# Patient Record
Sex: FEMALE | Race: WHITE | NOT HISPANIC OR LATINO | Employment: FULL TIME | URBAN - METROPOLITAN AREA
[De-identification: names, ages, dates, MRNs, and addresses within clinical notes are randomized per-mention and may not be internally consistent; named-entity substitution may affect disease eponyms.]

---

## 2023-03-21 ENCOUNTER — TELEPHONE (OUTPATIENT)
Dept: OBSTETRICS AND GYNECOLOGY | Facility: CLINIC | Age: 28
End: 2023-03-21
Payer: COMMERCIAL

## 2023-03-21 NOTE — TELEPHONE ENCOUNTER
----- Message from Suad Gilliam MA sent at 3/21/2023  1:08 PM CDT -----  Contact: PT  Has she ever seen you?  ----- Message -----  From: Frances Goff  Sent: 3/21/2023  12:12 PM CDT  To: Casandra Wallace I Staff    Type:  APPT REQUESY     Who Called: FORMER PT   Symptoms (please be specific): REPLACE BC IMPLANT    Would the patient rather a call back or a response via [facility]? CALL   Best Call Back Number: [phone]    Additional Information: THANK YOU

## 2023-03-21 NOTE — TELEPHONE ENCOUNTER
She has not been seen since 2020.  She will need to have her annual first and then I can order for her Nexplanon replacement.

## 2023-03-24 ENCOUNTER — LAB VISIT (OUTPATIENT)
Dept: LAB | Facility: CLINIC | Age: 28
End: 2023-03-24
Payer: COMMERCIAL

## 2023-03-24 ENCOUNTER — OFFICE VISIT (OUTPATIENT)
Dept: OBSTETRICS AND GYNECOLOGY | Facility: CLINIC | Age: 28
End: 2023-03-24
Payer: COMMERCIAL

## 2023-03-24 VITALS
BODY MASS INDEX: 23.84 KG/M2 | HEIGHT: 66 IN | HEART RATE: 102 BPM | WEIGHT: 148.38 LBS | SYSTOLIC BLOOD PRESSURE: 133 MMHG | DIASTOLIC BLOOD PRESSURE: 98 MMHG

## 2023-03-24 DIAGNOSIS — Z30.46 NEXPLANON REMOVAL: ICD-10-CM

## 2023-03-24 DIAGNOSIS — Z01.419 ENCOUNTER FOR WELL WOMAN EXAM WITH ROUTINE GYNECOLOGICAL EXAM: ICD-10-CM

## 2023-03-24 DIAGNOSIS — Z01.419 ENCOUNTER FOR WELL WOMAN EXAM WITH ROUTINE GYNECOLOGICAL EXAM: Primary | ICD-10-CM

## 2023-03-24 DIAGNOSIS — Z30.017 NEXPLANON INSERTION: ICD-10-CM

## 2023-03-24 LAB
CHOLEST SERPL-MCNC: 137 MG/DL (ref 120–199)
CHOLEST/HDLC SERPL: 3 {RATIO} (ref 2–5)
ESTIMATED AVG GLUCOSE: 91 MG/DL (ref 68–131)
HBA1C MFR BLD: 4.8 % (ref 4–5.6)
HDLC SERPL-MCNC: 45 MG/DL (ref 40–75)
HDLC SERPL: 32.8 % (ref 20–50)
LDLC SERPL CALC-MCNC: 77.4 MG/DL (ref 63–159)
NONHDLC SERPL-MCNC: 92 MG/DL
TRIGL SERPL-MCNC: 73 MG/DL (ref 30–150)

## 2023-03-24 PROCEDURE — 99385 PR PREVENTIVE VISIT,NEW,18-39: ICD-10-PCS | Mod: S$GLB,,, | Performed by: NURSE PRACTITIONER

## 2023-03-24 PROCEDURE — 1160F RVW MEDS BY RX/DR IN RCRD: CPT | Mod: S$GLB,,, | Performed by: NURSE PRACTITIONER

## 2023-03-24 PROCEDURE — 3080F DIAST BP >= 90 MM HG: CPT | Mod: S$GLB,,, | Performed by: NURSE PRACTITIONER

## 2023-03-24 PROCEDURE — 36415 COLL VENOUS BLD VENIPUNCTURE: CPT | Mod: ,,, | Performed by: STUDENT IN AN ORGANIZED HEALTH CARE EDUCATION/TRAINING PROGRAM

## 2023-03-24 PROCEDURE — 83036 HEMOGLOBIN GLYCOSYLATED A1C: CPT | Performed by: NURSE PRACTITIONER

## 2023-03-24 PROCEDURE — 3075F PR MOST RECENT SYSTOLIC BLOOD PRESS GE 130-139MM HG: ICD-10-PCS | Mod: S$GLB,,, | Performed by: NURSE PRACTITIONER

## 2023-03-24 PROCEDURE — 36415 PR COLLECTION VENOUS BLOOD,VENIPUNCTURE: ICD-10-PCS | Mod: ,,, | Performed by: STUDENT IN AN ORGANIZED HEALTH CARE EDUCATION/TRAINING PROGRAM

## 2023-03-24 PROCEDURE — 80061 LIPID PANEL: CPT | Performed by: NURSE PRACTITIONER

## 2023-03-24 PROCEDURE — 3080F PR MOST RECENT DIASTOLIC BLOOD PRESSURE >= 90 MM HG: ICD-10-PCS | Mod: S$GLB,,, | Performed by: NURSE PRACTITIONER

## 2023-03-24 PROCEDURE — 3008F BODY MASS INDEX DOCD: CPT | Mod: S$GLB,,, | Performed by: NURSE PRACTITIONER

## 2023-03-24 PROCEDURE — 99385 PREV VISIT NEW AGE 18-39: CPT | Mod: S$GLB,,, | Performed by: NURSE PRACTITIONER

## 2023-03-24 PROCEDURE — 1159F MED LIST DOCD IN RCRD: CPT | Mod: S$GLB,,, | Performed by: NURSE PRACTITIONER

## 2023-03-24 PROCEDURE — 3075F SYST BP GE 130 - 139MM HG: CPT | Mod: S$GLB,,, | Performed by: NURSE PRACTITIONER

## 2023-03-24 PROCEDURE — 1160F PR REVIEW ALL MEDS BY PRESCRIBER/CLIN PHARMACIST DOCUMENTED: ICD-10-PCS | Mod: S$GLB,,, | Performed by: NURSE PRACTITIONER

## 2023-03-24 PROCEDURE — 88175 CYTOPATH C/V AUTO FLUID REDO: CPT | Performed by: NURSE PRACTITIONER

## 2023-03-24 PROCEDURE — 1159F PR MEDICATION LIST DOCUMENTED IN MEDICAL RECORD: ICD-10-PCS | Mod: S$GLB,,, | Performed by: NURSE PRACTITIONER

## 2023-03-24 PROCEDURE — 3008F PR BODY MASS INDEX (BMI) DOCUMENTED: ICD-10-PCS | Mod: S$GLB,,, | Performed by: NURSE PRACTITIONER

## 2023-03-24 RX ORDER — FLUCONAZOLE 150 MG/1
TABLET ORAL
Qty: 2 TABLET | Refills: 3 | Status: SHIPPED | OUTPATIENT
Start: 2023-03-24 | End: 2023-04-07 | Stop reason: SDUPTHER

## 2023-03-24 NOTE — PROGRESS NOTES
"CC: Well woman exam    Jennifer Yang is a 27 y.o. female No obstetric history on file. presents for well woman exam.  LMP: Patient's last menstrual period was 02/15/2023 (exact date)..   Patients last pap was 2019.  Last wellness labs were done 2020.   Birth control nexplanon.  SBE yes. PCP-none.  She is due for Nexplanon replacement.    Chief Complaint   Patient presents with    Well Woman        History reviewed. No pertinent past medical history.  History reviewed. No pertinent surgical history.  Social History     Socioeconomic History    Marital status: Single   Tobacco Use    Smoking status: Never    Smokeless tobacco: Never     Family History   Problem Relation Age of Onset    Irritable bowel syndrome Mother     Endometriosis Mother      OB History    No obstetric history on file.         BP (!) 133/98 (BP Location: Right arm, Patient Position: Sitting)   Pulse 102   Ht 5' 6" (1.676 m)   Wt 67.3 kg (148 lb 6.4 oz)   LMP 02/15/2023 (Exact Date) Comment: nexplanon  BMI 23.95 kg/m²       ROS:  GENERAL: Denies weight gain or weight loss. Feeling well overall.   SKIN: Denies rash or lesions.   HEAD: Denies head injury or headache.   NODES: Denies enlarged lymph nodes.   CHEST: Denies chest pain or shortness of breath.   CARDIOVASCULAR: Denies palpitations or left sided chest pain.   ABDOMEN: No abdominal pain, constipation, diarrhea, nausea, vomiting or rectal bleeding.   URINARY: No frequency, dysuria, hematuria, or burning on urination.  REPRODUCTIVE: See HPI.   BREASTS: The patient performs breast self-examination and denies pain, lumps, or nipple discharge.   HEMATOLOGIC: No easy bruisability or excessive bleeding.   MUSCULOSKELETAL: Denies joint pain or swelling.   NEUROLOGIC: Denies syncope or weakness.   PSYCHIATRIC: Denies depression, anxiety or mood swings.    PHYSICAL EXAM:  APPEARANCE: Well nourished, well developed, in no acute distress.  AFFECT: WNL, alert and oriented x 3  SKIN: No acne or " hirsutism  NECK: Neck symmetric without masses or thyromegaly  NODES: No inguinal, cervical, or axillary lymph node enlargement  CHEST: Good respiratory effect  ABDOMEN: Soft.  No tenderness or masses.  No hepatosplenomegaly.  No hernias.  BREASTS: Symmetrical, no skin changes or visible lesions.  No palpable masses, nipple discharge bilaterally.  PELVIC: Normal external genitalia without lesions.  Normal urethral meatus.  Vagina well rugated without lesions or discharge.  Cervix pink, without lesions, discharge or tenderness.  No significant cystocele or rectocele.  Bimanual exam shows uterus to be normal size, regular, mobile and nontender.  Adnexa without masses or tenderness.    EXTREMITIES: No edema.    Encounter for well woman exam with routine gynecological exam  -     Lipid Panel; Future; Expected date: 03/24/2023  -     Hemoglobin A1C; Future; Expected date: 03/24/2023  -     Liquid-Based Pap Smear, Screening    Nexplanon insertion  -     Device Authorization Order    Nexplanon removal  -     Device Authorization Order    Other orders  -     fluconazole (DIFLUCAN) 150 MG Tab; Take one by mouth now and may repeat in 72 hours.  Dispense: 2 tablet; Refill: 3      She will f/u for Nexplanon replacement.      Patient was counseled today on A.C.S. Pap guidelines and recommendations for yearly pelvic exams, mammograms and monthly self breast exams; to see her PCP for other health maintenance.     Follow up in about 1 year (around 3/24/2024).

## 2023-03-30 LAB
FINAL PATHOLOGIC DIAGNOSIS: NORMAL
Lab: NORMAL

## 2023-04-12 ENCOUNTER — PROCEDURE VISIT (OUTPATIENT)
Dept: OBSTETRICS AND GYNECOLOGY | Facility: CLINIC | Age: 28
End: 2023-04-12
Payer: COMMERCIAL

## 2023-04-12 VITALS
BODY MASS INDEX: 23.14 KG/M2 | WEIGHT: 144 LBS | HEIGHT: 66 IN | DIASTOLIC BLOOD PRESSURE: 58 MMHG | SYSTOLIC BLOOD PRESSURE: 100 MMHG

## 2023-04-12 DIAGNOSIS — Z30.017 NEXPLANON INSERTION: ICD-10-CM

## 2023-04-12 DIAGNOSIS — Z30.46 ENCOUNTER FOR SURVEILLANCE OF IMPLANTABLE SUBDERMAL CONTRACEPTIVE: Primary | ICD-10-CM

## 2023-04-12 DIAGNOSIS — Z30.46 NEXPLANON REMOVAL: ICD-10-CM

## 2023-04-12 LAB
B-HCG UR QL: NEGATIVE
CTP QC/QA: YES

## 2023-04-12 PROCEDURE — 11983 REMOVE/INSERT DRUG IMPLANT: CPT | Mod: S$GLB,,, | Performed by: NURSE PRACTITIONER

## 2023-04-12 PROCEDURE — 81025 POCT URINE PREGNANCY: ICD-10-PCS | Mod: S$GLB,,, | Performed by: NURSE PRACTITIONER

## 2023-04-12 PROCEDURE — 11983 REMOVAL OF NEXPLANON DEVICE: ICD-10-PCS | Mod: S$GLB,,, | Performed by: NURSE PRACTITIONER

## 2023-04-12 PROCEDURE — 81025 URINE PREGNANCY TEST: CPT | Mod: S$GLB,,, | Performed by: NURSE PRACTITIONER

## 2023-04-12 NOTE — PROCEDURES
Removal of Nexplanon Device    Date/Time: 4/12/2023 9:30 AM  Performed by: Trudi Duffy NP  Authorized by: Trudi Duffy NP     Consent obtained:  Written  Consent given by:  Patient  Procedure risks and benefits discussed: yes    Patient questions answered: yes    Patient agrees, verbalizes understanding, and wants to proceed: yes    Instructions and paperwork completed: yes    Implant grasped by: hemostat  Removed with no complications: yes    Removal due to expiration: yes    Arm: left  Palpation confirms location: yes  Small stab incision was made in arm: yes  Upon removal device was intact: yes  Site was close with steri-strips and pressure bandage applied: yes  Prepped with:  povidone-iodine 7.5% surgical scrub  Local anesthetic:  Lidocaine with epinephrine   The site was cleaned  and prepped in a sterile fashion: yes  Insertion of Nexplanon    Date/Time: 4/12/2023 9:30 AM  Performed by: Trudi Duffy NP  Authorized by: Trudi Duffy NP     Consent obtained:  Verbal and written  Consent given by:  Patient  Patient questions answered: yes    Patient agrees, verbalizes understanding, and wants to proceed: yes    Instructions and paperwork completed: yes    Pre-procedure timeout performed: yes    Prepped with: alcohol 70% and povidone-iodine    Local anesthetic:  Xylocaine with epinephrine  The site was cleaned and prepped in a sterile fashion: yes    Small stab incision was made in arm: no     68 mg etonogestreL 68 mg  Preloaded Implanon trocar was placed subdermally: yes    Visualization of implant was obtained: yes    Nexplanon was inserted and trocar removed: yes    Visualization of notch in stilette and palpitation of device: yes    Palpitation confirms placement by provider and patient: yes    Site was closed with steri-strips and pressure bandage applied: yes

## 2023-08-23 ENCOUNTER — OFFICE VISIT (OUTPATIENT)
Dept: FAMILY MEDICINE | Facility: CLINIC | Age: 28
End: 2023-08-23
Payer: COMMERCIAL

## 2023-08-23 VITALS
DIASTOLIC BLOOD PRESSURE: 68 MMHG | WEIGHT: 132.63 LBS | HEART RATE: 90 BPM | BODY MASS INDEX: 21.32 KG/M2 | RESPIRATION RATE: 14 BRPM | HEIGHT: 66 IN | SYSTOLIC BLOOD PRESSURE: 102 MMHG | OXYGEN SATURATION: 99 %

## 2023-08-23 DIAGNOSIS — Z13.31 POSITIVE DEPRESSION SCREENING: Primary | ICD-10-CM

## 2023-08-23 DIAGNOSIS — F33.2 SEVERE EPISODE OF RECURRENT MAJOR DEPRESSIVE DISORDER, WITHOUT PSYCHOTIC FEATURES: ICD-10-CM

## 2023-08-23 PROCEDURE — 3008F PR BODY MASS INDEX (BMI) DOCUMENTED: ICD-10-PCS | Mod: S$GLB,,, | Performed by: INTERNAL MEDICINE

## 2023-08-23 PROCEDURE — 99203 OFFICE O/P NEW LOW 30 MIN: CPT | Mod: S$GLB,,, | Performed by: INTERNAL MEDICINE

## 2023-08-23 PROCEDURE — 1159F MED LIST DOCD IN RCRD: CPT | Mod: S$GLB,,, | Performed by: INTERNAL MEDICINE

## 2023-08-23 PROCEDURE — 1159F PR MEDICATION LIST DOCUMENTED IN MEDICAL RECORD: ICD-10-PCS | Mod: S$GLB,,, | Performed by: INTERNAL MEDICINE

## 2023-08-23 PROCEDURE — 3074F SYST BP LT 130 MM HG: CPT | Mod: S$GLB,,, | Performed by: INTERNAL MEDICINE

## 2023-08-23 PROCEDURE — 1160F PR REVIEW ALL MEDS BY PRESCRIBER/CLIN PHARMACIST DOCUMENTED: ICD-10-PCS | Mod: S$GLB,,, | Performed by: INTERNAL MEDICINE

## 2023-08-23 PROCEDURE — 3078F DIAST BP <80 MM HG: CPT | Mod: S$GLB,,, | Performed by: INTERNAL MEDICINE

## 2023-08-23 PROCEDURE — 3008F BODY MASS INDEX DOCD: CPT | Mod: S$GLB,,, | Performed by: INTERNAL MEDICINE

## 2023-08-23 PROCEDURE — 3074F PR MOST RECENT SYSTOLIC BLOOD PRESSURE < 130 MM HG: ICD-10-PCS | Mod: S$GLB,,, | Performed by: INTERNAL MEDICINE

## 2023-08-23 PROCEDURE — 99999 PR PBB SHADOW E&M-EST. PATIENT-LVL III: CPT | Mod: PBBFAC,,, | Performed by: INTERNAL MEDICINE

## 2023-08-23 PROCEDURE — 3044F HG A1C LEVEL LT 7.0%: CPT | Mod: S$GLB,,, | Performed by: INTERNAL MEDICINE

## 2023-08-23 PROCEDURE — 1160F RVW MEDS BY RX/DR IN RCRD: CPT | Mod: S$GLB,,, | Performed by: INTERNAL MEDICINE

## 2023-08-23 PROCEDURE — 99203 PR OFFICE/OUTPT VISIT, NEW, LEVL III, 30-44 MIN: ICD-10-PCS | Mod: S$GLB,,, | Performed by: INTERNAL MEDICINE

## 2023-08-23 PROCEDURE — 99999 PR PBB SHADOW E&M-EST. PATIENT-LVL III: ICD-10-PCS | Mod: PBBFAC,,, | Performed by: INTERNAL MEDICINE

## 2023-08-23 PROCEDURE — 3078F PR MOST RECENT DIASTOLIC BLOOD PRESSURE < 80 MM HG: ICD-10-PCS | Mod: S$GLB,,, | Performed by: INTERNAL MEDICINE

## 2023-08-23 PROCEDURE — 3044F PR MOST RECENT HEMOGLOBIN A1C LEVEL <7.0%: ICD-10-PCS | Mod: S$GLB,,, | Performed by: INTERNAL MEDICINE

## 2023-08-23 RX ORDER — SERTRALINE HYDROCHLORIDE 50 MG/1
50 TABLET, FILM COATED ORAL DAILY
Qty: 30 TABLET | Refills: 11 | Status: SHIPPED | OUTPATIENT
Start: 2023-08-23 | End: 2023-09-20 | Stop reason: SDUPTHER
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
## 2023-08-23 NOTE — ASSESSMENT & PLAN NOTE
T/c referral to Psych  Restart zoloft  Patient was advised re posssible AEs including sometimes inc depr, suicide initially

## 2023-08-25 ENCOUNTER — PATIENT MESSAGE (OUTPATIENT)
Dept: PRIMARY CARE CLINIC | Facility: CLINIC | Age: 28
End: 2023-08-25
Payer: COMMERCIAL

## 2023-09-20 ENCOUNTER — OFFICE VISIT (OUTPATIENT)
Dept: FAMILY MEDICINE | Facility: CLINIC | Age: 28
End: 2023-09-20
Payer: COMMERCIAL

## 2023-09-20 VITALS
HEIGHT: 66 IN | OXYGEN SATURATION: 98 % | SYSTOLIC BLOOD PRESSURE: 100 MMHG | DIASTOLIC BLOOD PRESSURE: 65 MMHG | BODY MASS INDEX: 22.31 KG/M2 | WEIGHT: 138.81 LBS | HEART RATE: 62 BPM

## 2023-09-20 DIAGNOSIS — F33.2 SEVERE EPISODE OF RECURRENT MAJOR DEPRESSIVE DISORDER, WITHOUT PSYCHOTIC FEATURES: ICD-10-CM

## 2023-09-20 PROCEDURE — 3008F BODY MASS INDEX DOCD: CPT | Mod: S$GLB,,, | Performed by: INTERNAL MEDICINE

## 2023-09-20 PROCEDURE — 1160F RVW MEDS BY RX/DR IN RCRD: CPT | Mod: S$GLB,,, | Performed by: INTERNAL MEDICINE

## 2023-09-20 PROCEDURE — 3074F SYST BP LT 130 MM HG: CPT | Mod: S$GLB,,, | Performed by: INTERNAL MEDICINE

## 2023-09-20 PROCEDURE — 3078F PR MOST RECENT DIASTOLIC BLOOD PRESSURE < 80 MM HG: ICD-10-PCS | Mod: S$GLB,,, | Performed by: INTERNAL MEDICINE

## 2023-09-20 PROCEDURE — 99213 OFFICE O/P EST LOW 20 MIN: CPT | Mod: S$GLB,,, | Performed by: INTERNAL MEDICINE

## 2023-09-20 PROCEDURE — 3044F HG A1C LEVEL LT 7.0%: CPT | Mod: S$GLB,,, | Performed by: INTERNAL MEDICINE

## 2023-09-20 PROCEDURE — 99213 PR OFFICE/OUTPT VISIT, EST, LEVL III, 20-29 MIN: ICD-10-PCS | Mod: S$GLB,,, | Performed by: INTERNAL MEDICINE

## 2023-09-20 PROCEDURE — 3078F DIAST BP <80 MM HG: CPT | Mod: S$GLB,,, | Performed by: INTERNAL MEDICINE

## 2023-09-20 PROCEDURE — 3044F PR MOST RECENT HEMOGLOBIN A1C LEVEL <7.0%: ICD-10-PCS | Mod: S$GLB,,, | Performed by: INTERNAL MEDICINE

## 2023-09-20 PROCEDURE — 1160F PR REVIEW ALL MEDS BY PRESCRIBER/CLIN PHARMACIST DOCUMENTED: ICD-10-PCS | Mod: S$GLB,,, | Performed by: INTERNAL MEDICINE

## 2023-09-20 PROCEDURE — 1159F PR MEDICATION LIST DOCUMENTED IN MEDICAL RECORD: ICD-10-PCS | Mod: S$GLB,,, | Performed by: INTERNAL MEDICINE

## 2023-09-20 PROCEDURE — 1159F MED LIST DOCD IN RCRD: CPT | Mod: S$GLB,,, | Performed by: INTERNAL MEDICINE

## 2023-09-20 PROCEDURE — 3074F PR MOST RECENT SYSTOLIC BLOOD PRESSURE < 130 MM HG: ICD-10-PCS | Mod: S$GLB,,, | Performed by: INTERNAL MEDICINE

## 2023-09-20 PROCEDURE — 3008F PR BODY MASS INDEX (BMI) DOCUMENTED: ICD-10-PCS | Mod: S$GLB,,, | Performed by: INTERNAL MEDICINE

## 2023-09-20 RX ORDER — SERTRALINE HYDROCHLORIDE 50 MG/1
50 TABLET, FILM COATED ORAL NIGHTLY
Qty: 90 TABLET | Refills: 3 | Status: SHIPPED | OUTPATIENT
Start: 2023-09-20 | End: 2023-12-12 | Stop reason: SDUPTHER

## 2023-09-20 NOTE — PROGRESS NOTES
Subjective:       Patient ID: Jennifer Yang is a 27 y.o. female.    Chief Complaint: Follow-up (Medication refills )      Patient is established already .......... presents today for a f/u visit , to discuss zoloft Rx , depression        Follow-up  Pertinent negatives include no fever.   Depression  Visit Type: follow-up  Patient presents with the following symptoms: depressed mood.  Frequency of symptoms: occasionally   Severity: mild   Sleep quality: good  Compliance with medications:  %      Review of Systems   Constitutional:  Negative for activity change, fever and unexpected weight change.   Respiratory: Negative.     Cardiovascular: Negative.    Neurological: Negative.    Psychiatric/Behavioral:  Positive for depression and dysphoric mood.         Improved symptoms         Objective:      Physical Exam  Vitals and nursing note reviewed.   Constitutional:       Appearance: Normal appearance.   Cardiovascular:      Rate and Rhythm: Normal rate and regular rhythm.   Pulmonary:      Effort: Pulmonary effort is normal.      Breath sounds: Normal breath sounds.   Musculoskeletal:      Cervical back: Normal range of motion and neck supple.   Neurological:      Mental Status: She is alert.   Psychiatric:         Mood and Affect: Mood normal.         Behavior: Behavior normal.         Thought Content: Thought content normal.         Judgment: Judgment normal.         Assessment:       1. Severe episode of recurrent major depressive disorder, without psychotic features  Overview:  She has no SI, HI  Was diagnosed in the past with depression/anxiety  Stopped zoloft about 4 years ago b/o her fiance    Assessment & Plan:  Currently back on zoloft  Improved  Refill it at same 50mg a day  F/u in 3M      Other orders  -     sertraline (ZOLOFT) 50 MG tablet; Take 1 tablet (50 mg total) by mouth every evening.  Dispense: 90 tablet; Refill: 3           Plan:       1. Severe episode of recurrent major depressive disorder,  without psychotic features  Overview:  She has no SI, HI  Was diagnosed in the past with depression/anxiety  Stopped zoloft about 4 years ago b/o her fiance    Assessment & Plan:  Currently back on zoloft  Improved  Refill it at same 50mg a day  F/u in 3M      Other orders  -     sertraline (ZOLOFT) 50 MG tablet; Take 1 tablet (50 mg total) by mouth every evening.  Dispense: 90 tablet; Refill: 3

## 2023-09-20 NOTE — Clinical Note
I gave the patient written recommendations re the outstanding vaccinations. Defer HPV vaccine to Dr Guajardo

## 2023-12-12 RX ORDER — SERTRALINE HYDROCHLORIDE 50 MG/1
50 TABLET, FILM COATED ORAL NIGHTLY
Qty: 30 TABLET | Refills: 1 | Status: SHIPPED | OUTPATIENT
Start: 2023-12-12 | End: 2023-12-28

## 2023-12-13 ENCOUNTER — PATIENT MESSAGE (OUTPATIENT)
Dept: FAMILY MEDICINE | Facility: CLINIC | Age: 28
End: 2023-12-13
Payer: COMMERCIAL

## 2023-12-28 ENCOUNTER — OFFICE VISIT (OUTPATIENT)
Dept: FAMILY MEDICINE | Facility: CLINIC | Age: 28
End: 2023-12-28
Payer: COMMERCIAL

## 2023-12-28 VITALS
HEIGHT: 66 IN | SYSTOLIC BLOOD PRESSURE: 115 MMHG | HEART RATE: 76 BPM | OXYGEN SATURATION: 98 % | BODY MASS INDEX: 22.82 KG/M2 | DIASTOLIC BLOOD PRESSURE: 82 MMHG | WEIGHT: 142 LBS

## 2023-12-28 DIAGNOSIS — F33.2 SEVERE EPISODE OF RECURRENT MAJOR DEPRESSIVE DISORDER, WITHOUT PSYCHOTIC FEATURES: ICD-10-CM

## 2023-12-28 DIAGNOSIS — E78.2 MIXED HYPERLIPIDEMIA: ICD-10-CM

## 2023-12-28 DIAGNOSIS — Z13.1 DIABETES MELLITUS SCREENING: ICD-10-CM

## 2023-12-28 DIAGNOSIS — R73.9 ELEVATED BLOOD SUGAR: Primary | ICD-10-CM

## 2023-12-28 PROCEDURE — 3079F PR MOST RECENT DIASTOLIC BLOOD PRESSURE 80-89 MM HG: ICD-10-PCS | Mod: S$GLB,,, | Performed by: INTERNAL MEDICINE

## 2023-12-28 PROCEDURE — 3044F HG A1C LEVEL LT 7.0%: CPT | Mod: S$GLB,,, | Performed by: INTERNAL MEDICINE

## 2023-12-28 PROCEDURE — 3008F PR BODY MASS INDEX (BMI) DOCUMENTED: ICD-10-PCS | Mod: S$GLB,,, | Performed by: INTERNAL MEDICINE

## 2023-12-28 PROCEDURE — 3079F DIAST BP 80-89 MM HG: CPT | Mod: S$GLB,,, | Performed by: INTERNAL MEDICINE

## 2023-12-28 PROCEDURE — 1160F RVW MEDS BY RX/DR IN RCRD: CPT | Mod: S$GLB,,, | Performed by: INTERNAL MEDICINE

## 2023-12-28 PROCEDURE — 99213 PR OFFICE/OUTPT VISIT, EST, LEVL III, 20-29 MIN: ICD-10-PCS | Mod: S$GLB,,, | Performed by: INTERNAL MEDICINE

## 2023-12-28 PROCEDURE — 3074F SYST BP LT 130 MM HG: CPT | Mod: S$GLB,,, | Performed by: INTERNAL MEDICINE

## 2023-12-28 PROCEDURE — 3044F PR MOST RECENT HEMOGLOBIN A1C LEVEL <7.0%: ICD-10-PCS | Mod: S$GLB,,, | Performed by: INTERNAL MEDICINE

## 2023-12-28 PROCEDURE — 3074F PR MOST RECENT SYSTOLIC BLOOD PRESSURE < 130 MM HG: ICD-10-PCS | Mod: S$GLB,,, | Performed by: INTERNAL MEDICINE

## 2023-12-28 PROCEDURE — 1159F MED LIST DOCD IN RCRD: CPT | Mod: S$GLB,,, | Performed by: INTERNAL MEDICINE

## 2023-12-28 PROCEDURE — 99213 OFFICE O/P EST LOW 20 MIN: CPT | Mod: S$GLB,,, | Performed by: INTERNAL MEDICINE

## 2023-12-28 PROCEDURE — 1159F PR MEDICATION LIST DOCUMENTED IN MEDICAL RECORD: ICD-10-PCS | Mod: S$GLB,,, | Performed by: INTERNAL MEDICINE

## 2023-12-28 PROCEDURE — 3008F BODY MASS INDEX DOCD: CPT | Mod: S$GLB,,, | Performed by: INTERNAL MEDICINE

## 2023-12-28 PROCEDURE — 1160F PR REVIEW ALL MEDS BY PRESCRIBER/CLIN PHARMACIST DOCUMENTED: ICD-10-PCS | Mod: S$GLB,,, | Performed by: INTERNAL MEDICINE

## 2023-12-28 RX ORDER — ESCITALOPRAM OXALATE 10 MG/1
10 TABLET ORAL DAILY
Qty: 30 TABLET | Refills: 2 | Status: SHIPPED | OUTPATIENT
Start: 2023-12-28 | End: 2024-03-27

## 2023-12-28 NOTE — PROGRESS NOTES
Subjective:       Patient ID: Jennifer Yang is a 28 y.o. female.    Chief Complaint: Medication Problem (Change Lexapro. Causes night sweats. )      Patient is established already .......... presents today for a f/u visit , to discuss meds for depression    Depression  Visit Type: follow-up  Frequency of symptoms: occasionally   Severity: mild   Compliance with medications:  %  Treatment side effects: inc sweating, upset stomach.    Review of Systems   Constitutional:  Negative for activity change, fever and unexpected weight change.   Respiratory: Negative.     Cardiovascular: Negative.    Gastrointestinal:         Heart burns   Endocrine:        Diaphoresis   Neurological: Negative.    Psychiatric/Behavioral:  Positive for depression.          Objective:      Physical Exam  Vitals and nursing note reviewed.   Constitutional:       Appearance: Normal appearance.   Cardiovascular:      Rate and Rhythm: Normal rate and regular rhythm.   Pulmonary:      Effort: Pulmonary effort is normal.      Breath sounds: Normal breath sounds.   Musculoskeletal:      Cervical back: Normal range of motion and neck supple.   Neurological:      Mental Status: She is alert.   Psychiatric:         Mood and Affect: Mood normal.         Assessment:       1. Elevated blood sugar  -     Hemoglobin A1C; Standing    2. Mixed hyperlipidemia  -     Lipid Panel; Future; Expected date: 12/28/2023    3. Diabetes mellitus screening  -     Glucose, Fasting; Future; Expected date: 12/28/2023    4. Severe episode of recurrent major depressive disorder, without psychotic features  Overview:  She has no SI, HI  Was diagnosed in the past with depression/anxiety  Stopped zoloft about 4 years ago b/o her fiance    Assessment & Plan:  AEs from  zoloft  C/o ' stomach upset ' and diaphoresis  Switch to lexapro      Other orders  -     EScitalopram oxalate (LEXAPRO) 10 MG tablet; Take 1 tablet (10 mg total) by mouth once daily.  Dispense: 30 tablet;  Refill: 2           Plan:       1. Elevated blood sugar  -     Hemoglobin A1C; Standing    2. Mixed hyperlipidemia  -     Lipid Panel; Future; Expected date: 12/28/2023    3. Diabetes mellitus screening  -     Glucose, Fasting; Future; Expected date: 12/28/2023    4. Severe episode of recurrent major depressive disorder, without psychotic features  Overview:  She has no SI, HI  Was diagnosed in the past with depression/anxiety  Stopped zoloft about 4 years ago b/o her fiance    Assessment & Plan:  AEs from  zoloft  C/o ' stomach upset ' and diaphoresis  Switch to lexapro      Other orders  -     EScitalopram oxalate (LEXAPRO) 10 MG tablet; Take 1 tablet (10 mg total) by mouth once daily.  Dispense: 30 tablet; Refill: 2

## 2024-02-16 ENCOUNTER — OFFICE VISIT (OUTPATIENT)
Dept: OTOLARYNGOLOGY | Facility: CLINIC | Age: 29
End: 2024-02-16
Payer: COMMERCIAL

## 2024-02-16 VITALS — BODY MASS INDEX: 22.56 KG/M2 | HEIGHT: 66 IN | WEIGHT: 140.38 LBS

## 2024-02-16 DIAGNOSIS — Z87.09 HISTORY OF PARANASAL SINUS PAIN: ICD-10-CM

## 2024-02-16 DIAGNOSIS — J34.89 NASAL VESTIBULITIS: Primary | ICD-10-CM

## 2024-02-16 PROCEDURE — 99999 PR PBB SHADOW E&M-EST. PATIENT-LVL III: CPT | Mod: PBBFAC,,, | Performed by: OTOLARYNGOLOGY

## 2024-02-16 PROCEDURE — 99203 OFFICE O/P NEW LOW 30 MIN: CPT | Mod: S$GLB,,, | Performed by: OTOLARYNGOLOGY

## 2024-02-16 PROCEDURE — 1160F RVW MEDS BY RX/DR IN RCRD: CPT | Mod: S$GLB,,, | Performed by: OTOLARYNGOLOGY

## 2024-02-16 PROCEDURE — 1159F MED LIST DOCD IN RCRD: CPT | Mod: S$GLB,,, | Performed by: OTOLARYNGOLOGY

## 2024-02-16 PROCEDURE — 3008F BODY MASS INDEX DOCD: CPT | Mod: S$GLB,,, | Performed by: OTOLARYNGOLOGY

## 2024-02-16 RX ORDER — AZELASTINE 1 MG/ML
SPRAY, METERED NASAL
Qty: 30 ML | Refills: 11 | Status: SHIPPED | OUTPATIENT
Start: 2024-02-16

## 2024-02-16 RX ORDER — SULFAMETHOXAZOLE AND TRIMETHOPRIM 800; 160 MG/1; MG/1
1 TABLET ORAL 2 TIMES DAILY
Qty: 42 TABLET | Refills: 0 | Status: SHIPPED | OUTPATIENT
Start: 2024-02-16 | End: 2024-03-08

## 2024-02-16 RX ORDER — MUPIROCIN 20 MG/G
OINTMENT TOPICAL
Qty: 1 EACH | Refills: 5 | Status: SHIPPED | OUTPATIENT
Start: 2024-02-16

## 2024-02-16 NOTE — PROGRESS NOTES
Subjective:       Patient ID: Jennifer Yang is a 28 y.o. female.    Chief Complaint: Sinus Problem (Pt c/o stuffy nose, congestion, sore throat, and tonsil stones for a week. )      This 28-year-old tells me she has just had a lifelong history of sinus and nasal problems and one of her primary issues is right-sided paranasal sinus pain when she has any kind of flare-up she not only has nasal airway obstruction but she has paranasal sinus pain around her right eye midface points to the medial canthus on the right almost never on the left    She is also mentioned tonsil stones and perhaps wonders if they are related to sinus and nasal symptoms.  Has been awhile since she has had an antibiotics but in the past they have not helped her as much as she hoped or has a lasting effect as she had hoped.  She is tried Flonase and did not get much benefit she does not think she is ever tried azelastine.          Objective:      ENT Physical Exam    Her physical exam is remarkable for quite a lot of room in her nose she has a relatively midline septum although there has a little bit of a external dorsal deflection but not as much on the inside that has a good bit of crustiness of the right anterior septum but there has a lot of room that makes me more suspicious that any pain and pressure on the right side could be a sinus obstruction and/or infection but we also discussed as mentioned below the possibility of a headache syndrome since pain and pressure is a significant component of her symptoms.      Her oropharynx looks normal I do not see any tonsil stones today and her tympanic membranes and ear canals look okay today        Assessment:       1. Nasal vestibulitis    2. History of paranasal sinus pain         Plan:          So I am going to treat her for about three weeks with Bactrim and include azelastine and mupirocin since she has crusting in her anterior nasal cavity.    We probably should do some sinus radiology when  she is throat at least x-rays here in the office

## 2024-02-23 ENCOUNTER — PATIENT MESSAGE (OUTPATIENT)
Dept: FAMILY MEDICINE | Facility: CLINIC | Age: 29
End: 2024-02-23
Payer: COMMERCIAL

## 2024-02-26 ENCOUNTER — LAB VISIT (OUTPATIENT)
Dept: LAB | Facility: CLINIC | Age: 29
End: 2024-02-26
Payer: COMMERCIAL

## 2024-02-26 DIAGNOSIS — R73.9 ELEVATED BLOOD SUGAR: ICD-10-CM

## 2024-02-26 DIAGNOSIS — Z13.1 DIABETES MELLITUS SCREENING: ICD-10-CM

## 2024-02-26 DIAGNOSIS — E78.2 MIXED HYPERLIPIDEMIA: ICD-10-CM

## 2024-02-26 LAB
CHOLEST SERPL-MCNC: 127 MG/DL (ref 120–199)
CHOLEST/HDLC SERPL: 2.8 {RATIO} (ref 2–5)
ESTIMATED AVG GLUCOSE: 91 MG/DL (ref 68–131)
GLUCOSE SERPL-MCNC: 88 MG/DL (ref 70–110)
HBA1C MFR BLD: 4.8 % (ref 4–5.6)
HDLC SERPL-MCNC: 46 MG/DL (ref 40–75)
HDLC SERPL: 36.2 % (ref 20–50)
LDLC SERPL CALC-MCNC: 71.2 MG/DL (ref 63–159)
NONHDLC SERPL-MCNC: 81 MG/DL
TRIGL SERPL-MCNC: 49 MG/DL (ref 30–150)

## 2024-02-26 PROCEDURE — 36415 COLL VENOUS BLD VENIPUNCTURE: CPT | Mod: ,,, | Performed by: INTERNAL MEDICINE

## 2024-02-26 PROCEDURE — 83036 HEMOGLOBIN GLYCOSYLATED A1C: CPT | Performed by: INTERNAL MEDICINE

## 2024-02-26 PROCEDURE — 82947 ASSAY GLUCOSE BLOOD QUANT: CPT | Performed by: INTERNAL MEDICINE

## 2024-02-26 PROCEDURE — 80061 LIPID PANEL: CPT | Performed by: INTERNAL MEDICINE

## 2024-03-01 ENCOUNTER — PATIENT MESSAGE (OUTPATIENT)
Dept: FAMILY MEDICINE | Facility: CLINIC | Age: 29
End: 2024-03-01
Payer: COMMERCIAL

## 2024-03-20 ENCOUNTER — OFFICE VISIT (OUTPATIENT)
Dept: OTOLARYNGOLOGY | Facility: CLINIC | Age: 29
End: 2024-03-20
Payer: COMMERCIAL

## 2024-03-20 VITALS — WEIGHT: 136.63 LBS | BODY MASS INDEX: 21.96 KG/M2 | HEIGHT: 66 IN

## 2024-03-20 DIAGNOSIS — J32.9 CHRONIC SINUSITIS, UNSPECIFIED LOCATION: Primary | ICD-10-CM

## 2024-03-20 DIAGNOSIS — Z87.09 HISTORY OF PARANASAL SINUS PAIN: ICD-10-CM

## 2024-03-20 DIAGNOSIS — J34.89 NASAL VESTIBULITIS: ICD-10-CM

## 2024-03-20 PROCEDURE — 3044F HG A1C LEVEL LT 7.0%: CPT | Mod: S$GLB,,, | Performed by: OTOLARYNGOLOGY

## 2024-03-20 PROCEDURE — 99999 PR PBB SHADOW E&M-EST. PATIENT-LVL III: CPT | Mod: PBBFAC,,, | Performed by: OTOLARYNGOLOGY

## 2024-03-20 PROCEDURE — 99213 OFFICE O/P EST LOW 20 MIN: CPT | Mod: S$GLB,,, | Performed by: OTOLARYNGOLOGY

## 2024-03-20 PROCEDURE — 1159F MED LIST DOCD IN RCRD: CPT | Mod: S$GLB,,, | Performed by: OTOLARYNGOLOGY

## 2024-03-20 PROCEDURE — 3008F BODY MASS INDEX DOCD: CPT | Mod: S$GLB,,, | Performed by: OTOLARYNGOLOGY

## 2024-03-20 PROCEDURE — 1160F RVW MEDS BY RX/DR IN RCRD: CPT | Mod: S$GLB,,, | Performed by: OTOLARYNGOLOGY

## 2024-03-20 NOTE — PROGRESS NOTES
Subjective:       Patient ID: Jennifer Yang is a 28 y.o. female.    Chief Complaint: Follow-up (Pt is here to follow up on sinus infection)      When I saw this generally healthy 28-year-old a few weeks ago she had obvious vestibulitis and sinusitis she tells me she feels a lot better she does not feel infected after mupirocin and Bactrim but she is still all stopped up it your usual condition she never does real well.  As          Objective:      ENT Physical Exam    Her nose still has some crusting especially on the right and some blood on the left she just does not have a lot of room she appears to have some anatomic issues with septal deviation and perhaps a strangely shaped left middle turbinate and that is still looks pretty inflamed her oropharynx is normal in her tympanic membranes and ear canals are normal        Assessment:       1. Chronic sinusitis, unspecified location    2. History of paranasal sinus pain    3. Nasal vestibulitis         Plan:          Her nose still looks infected and she has such a long history of trouble that even after antibiotics she is having problems I have recommended a CT scan of sinuses to further define anatomic issues and persistent infection and that is scheduled in the near future

## 2024-04-13 ENCOUNTER — E-VISIT (OUTPATIENT)
Dept: FAMILY MEDICINE | Facility: CLINIC | Age: 29
End: 2024-04-13
Payer: COMMERCIAL

## 2024-04-13 ENCOUNTER — PATIENT MESSAGE (OUTPATIENT)
Dept: FAMILY MEDICINE | Facility: CLINIC | Age: 29
End: 2024-04-13

## 2024-04-13 DIAGNOSIS — F32.1 CURRENT MODERATE EPISODE OF MAJOR DEPRESSIVE DISORDER WITHOUT PRIOR EPISODE: Primary | ICD-10-CM

## 2024-04-13 PROCEDURE — 99422 OL DIG E/M SVC 11-20 MIN: CPT | Mod: ,,, | Performed by: INTERNAL MEDICINE

## 2024-04-14 NOTE — PROGRESS NOTES
Subjective:       Patient ID: Jennifer Yang is a 28 y.o. female.    Chief Complaint: Medication Management (Entered automatically based on patient selection in Patient Portal.)      HPI h/o MDD  Denies any suiciadal or HI  Just feeling a little overwhelmed, feeling more anger and less impulse control behavior  Review of Systems as per above      Objective:      Physical Exam   deferred sec to e-visit nature  Assessment:       MDD  Worsenignimpulse control, anger feelings  No SI, HI   Plan:       Sched an nasrin soon  Inc lexapro to 20mg  Monitro closely         Patient is a 81y old  Male who presents with a chief complaint of splenic laceration s/p fall (04 Jan 2019 11:27)    HPI:  81 year old male pmhx as below, significant for A.fib on Eliquis, s/p fall 2 days ago presents for left side abdominal pain that began last night around 9pm. Patient was evaluated by the ED, was hypotensive and was started on levophed, he was taken for CTA of the chest/abdomen which was significant for a splenic laceration/subcapsular hematoma. Trauma was consulted at this time when the patient mentioned his fall 2 days prior. Patient was seen and examined. SBP at this time on levophed was 100-110 on the monitor. Patient complaining of lower left side abdominal pain and left shoulder pain. He states he fell 2 days ago but felt fine until last night around 9pm when he developed some abdominal pain, his last meal was yesterday around 5pm and the last time he took medications (Eliquis) was yesterday morning. (30 Dec 2018 08:48)      PAST MEDICAL & SURGICAL HISTORY:  CAD (coronary artery disease)  Ventricular tachycardia  NANDO on CPAP  COPD (chronic obstructive pulmonary disease)  Pacemaker  AICD (automatic cardioverter/defibrillator) present  Hypothyroidism  Atrial fibrillation  Congestive heart disease  Hypercholesteremia  AICD (automatic cardioverter/defibrillator) present  History of laparoscopic cholecystectomy      Hospital Course: s/p IR embolization of splenic artery for grade 2 splenic lac/subcapsular hematoma  eliquis held- sp transfusion prbcs  TODAY'S SUBJECTIVE & REVIEW OF SYMPTOMS:     Constitutional WNL   Cardio WNL   Resp SOB   GI WNL  Heme WNL  Endo WNL  Skin WNL  MSK OA L Knee  Neuro WNL  Cognitive WNL  Psych WNL  Falls X2    MEDICATIONS  (STANDING):  amiodarone    Tablet 400 milliGRAM(s) Oral every 12 hours  atorvastatin 10 milliGRAM(s) Oral at bedtime  chlorhexidine 4% Liquid 1 Application(s) Topical <User Schedule>  dextrose 5%. 1000 milliLiter(s) (50 mL/Hr) IV Continuous <Continuous>  dextrose 50% Injectable 12.5 Gram(s) IV Push once  dextrose 50% Injectable 25 Gram(s) IV Push once  dextrose 50% Injectable 25 Gram(s) IV Push once  finasteride 5 milliGRAM(s) Oral daily  heparin  Injectable 5000 Unit(s) SubCutaneous every 8 hours  insulin lispro (HumaLOG) corrective regimen sliding scale   SubCutaneous three times a day before meals  levothyroxine 50 MICROGram(s) Oral daily  montelukast 10 milliGRAM(s) Oral at bedtime  pantoprazole    Tablet 40 milliGRAM(s) Oral before breakfast  tamsulosin 0.4 milliGRAM(s) Oral at bedtime  vasopressin Infusion 0.04 Unit(s)/Min (2.4 mL/Hr) IV Continuous <Continuous>    MEDICATIONS  (PRN):  benzocaine 15 mG/menthol 3.6 mG Lozenge 1 Lozenge Oral every 3 hours PRN Sore Throat  glucagon  Injectable 1 milliGRAM(s) IntraMuscular once PRN Glucose LESS THAN 70 milligrams/deciliter      FAMILY HISTORY:  Family history of acute myocardial infarction (Aunt)      Allergies    morphine (Stomach Upset)    Intolerances        SOCIAL HISTORY:    [    ] Etoh  [    ] Smoking  [    ] Substance abuse     Home Environment:  [    ] Home Alone  [   x ] Lives with Family  [    ] Home Health Aid    Dwelling:  [    ] Apartment  [  x  ] Private House  [    ] Adult Home  [    ] Skilled Nursing Facility      [    ] Short Term  [    ] Long Term  [  x  ] Stairs    [id number]                       [    ] Elevator     FUNCTIONAL STATUS PTA: (Check all that apply)  Ambulation: [  x   ]Independent    [    ] Dependent     [    ] Non-Ambulatory  Assistive Device: [    ] SA Cane  [    ]  Q Cane  [    ] Walker  [    ]  Wheelchair  ADL : [ x   ] Independent  [    ]  Dependent   HAS CANE    Vital Signs Last 24 Hrs  T(C): 35.7 (04 Jan 2019 08:00), Max: 36.7 (03 Jan 2019 23:30)  T(F): 96.3 (04 Jan 2019 08:00), Max: 98 (03 Jan 2019 23:30)  HR: 94 (04 Jan 2019 08:00) (88 - 98)  BP: 117/59 (04 Jan 2019 08:00) (115/67 - 131/67)  BP(mean): 95 (03 Jan 2019 16:00) (86 - 95)  RR: 18 (04 Jan 2019 08:00) (18 - 34)  SpO2: 94% (04 Jan 2019 10:15) (92% - 99%)      PHYSICAL EXAM: Alert & Oriented X3  GENERAL: NAD, well-groomed, well-developed  HEAD:  Atraumatic, Normocephalic  EYES: EOMI, PERRLA, conjunctiva and sclera clear  NECK: Supple, No JVD, Normal thyroid  CHEST/LUNG: Clear bilaterally  HEART: S1S2   ABDOMEN: Soft, Nontender, Nondistended; Bowel sounds present  EXTREMITIES:  2+ Peripheral Pulses, No clubbing, cyanosis, or edema    NERVOUS SYSTEM:  Cranial Nerves 2-12 intact [  x  ] Abnormal  [    ]  ROM: WFL all extremities [  x  ]  Abnormal [     ]  Motor Strength: WFL all extremities  [  x  ]  Abnormal [    ]  Sensation: intact to light touch [ x   ] Abnormal [    ]      FUNCTIONAL STATUS:  Bed Mobility: [   ]  Independent [    ]  Supervision [ x   ]  Needs Assistance [  ]  N/A  Transfers: [    ]  Independent [    ]  Supervision [ x   ]  Needs Assistance [    ]  N/A    Ambulation:  [    ]  Independent [    ]  Supervision [    ]  Needs Assistance [ x   ]  N/A   ADL:  [    ]   Independent [  x  ] Requires Assistance [    ] N/A       LABS:                        8.3    12.25 )-----------( 223      ( 04 Jan 2019 00:28 )             25.5     01-04    142  |  104  |  32<H>  ----------------------------<  169<H>  4.6   |  21  |  1.4    Ca    8.2<L>      04 Jan 2019 00:28  Phos  2.1     01-04  Mg     2.2     01-04      PT/INR - ( 04 Jan 2019 00:28 )   PT: 13.50 sec;   INR: 1.18 ratio         PTT - ( 04 Jan 2019 00:28 )  PTT:27.1 sec      RADIOLOGY & ADDITIONAL STUDIES:

## 2024-04-15 RX ORDER — ESCITALOPRAM OXALATE 20 MG/1
20 TABLET ORAL DAILY
Qty: 30 TABLET | Refills: 2 | Status: SHIPPED | OUTPATIENT
Start: 2024-04-15 | End: 2024-07-14

## 2024-04-30 ENCOUNTER — PATIENT MESSAGE (OUTPATIENT)
Dept: FAMILY MEDICINE | Facility: CLINIC | Age: 29
End: 2024-04-30
Payer: COMMERCIAL

## 2024-08-07 ENCOUNTER — OFFICE VISIT (OUTPATIENT)
Dept: FAMILY MEDICINE | Facility: CLINIC | Age: 29
End: 2024-08-07
Payer: COMMERCIAL

## 2024-08-07 VITALS
HEIGHT: 66 IN | SYSTOLIC BLOOD PRESSURE: 112 MMHG | OXYGEN SATURATION: 98 % | BODY MASS INDEX: 21.33 KG/M2 | DIASTOLIC BLOOD PRESSURE: 76 MMHG | HEART RATE: 86 BPM | WEIGHT: 132.69 LBS | RESPIRATION RATE: 18 BRPM

## 2024-08-07 DIAGNOSIS — Z00.00 ANNUAL PHYSICAL EXAM: ICD-10-CM

## 2024-08-07 DIAGNOSIS — R61 EXCESSIVE SWEATING: ICD-10-CM

## 2024-08-07 DIAGNOSIS — R61 DIAPHORESIS: Primary | ICD-10-CM

## 2024-08-07 PROCEDURE — 3044F HG A1C LEVEL LT 7.0%: CPT | Mod: ,,, | Performed by: INTERNAL MEDICINE

## 2024-08-07 PROCEDURE — 1159F MED LIST DOCD IN RCRD: CPT | Mod: ,,, | Performed by: INTERNAL MEDICINE

## 2024-08-07 PROCEDURE — 1160F RVW MEDS BY RX/DR IN RCRD: CPT | Mod: ,,, | Performed by: INTERNAL MEDICINE

## 2024-08-07 PROCEDURE — 3074F SYST BP LT 130 MM HG: CPT | Mod: ,,, | Performed by: INTERNAL MEDICINE

## 2024-08-07 PROCEDURE — 3008F BODY MASS INDEX DOCD: CPT | Mod: ,,, | Performed by: INTERNAL MEDICINE

## 2024-08-07 PROCEDURE — 3078F DIAST BP <80 MM HG: CPT | Mod: ,,, | Performed by: INTERNAL MEDICINE

## 2024-08-07 PROCEDURE — 99213 OFFICE O/P EST LOW 20 MIN: CPT | Mod: 25,S$GLB,, | Performed by: INTERNAL MEDICINE

## 2024-08-07 PROCEDURE — 99395 PREV VISIT EST AGE 18-39: CPT | Mod: ,,, | Performed by: INTERNAL MEDICINE

## 2024-08-07 RX ORDER — ESCITALOPRAM OXALATE 10 MG/1
10 TABLET ORAL DAILY
Qty: 30 TABLET | Refills: 2 | Status: SHIPPED | OUTPATIENT
Start: 2024-08-07 | End: 2024-11-05

## 2025-01-23 NOTE — TELEPHONE ENCOUNTER
No care due was identified.  [facility] Embedded Care Due Messages. Reference number: [id number].   1/23/2025 5:36:00 PM CST

## 2025-01-24 RX ORDER — ESCITALOPRAM OXALATE 10 MG/1
10 TABLET ORAL DAILY
Qty: 30 TABLET | Status: SHIPPED | OUTPATIENT
Start: 2025-01-24 | End: 2025-01-24 | Stop reason: SDUPTHER

## 2025-01-24 RX ORDER — ESCITALOPRAM OXALATE 10 MG/1
10 TABLET ORAL DAILY
Qty: 30 TABLET | Refills: 0 | Status: SHIPPED | OUTPATIENT
Start: 2025-01-24 | End: 2025-01-31

## 2025-01-24 NOTE — TELEPHONE ENCOUNTER
Refill Routing Note   Medication(s) are not appropriate for processing by [facility] for the following reason(s):        No active prescription written by provider    [facility] action(s):  Defer      Medication Therapy Plan:  IN THE MED LIST 24.      Appointments  past 12m or future 3m with PCP    Date Provider   Last Visit   2024 Blaine Driscoll MD   Next Visit   Visit date not found Blaine Driscoll MD   ED visits in past 90 days: 0        Note composed:11:34 PM 2025

## 2025-01-31 ENCOUNTER — OFFICE VISIT (OUTPATIENT)
Dept: FAMILY MEDICINE | Facility: CLINIC | Age: 30
End: 2025-01-31
Payer: COMMERCIAL

## 2025-01-31 VITALS
WEIGHT: 122.31 LBS | SYSTOLIC BLOOD PRESSURE: 108 MMHG | HEART RATE: 86 BPM | DIASTOLIC BLOOD PRESSURE: 58 MMHG | BODY MASS INDEX: 19.66 KG/M2 | HEIGHT: 66 IN

## 2025-01-31 DIAGNOSIS — G89.29 CHRONIC BILATERAL BACK PAIN, UNSPECIFIED BACK LOCATION: ICD-10-CM

## 2025-01-31 DIAGNOSIS — M41.9 SCOLIOSIS, UNSPECIFIED SCOLIOSIS TYPE, UNSPECIFIED SPINAL REGION: ICD-10-CM

## 2025-01-31 DIAGNOSIS — F33.2 SEVERE EPISODE OF RECURRENT MAJOR DEPRESSIVE DISORDER, WITHOUT PSYCHOTIC FEATURES: Primary | ICD-10-CM

## 2025-01-31 DIAGNOSIS — M54.9 CHRONIC BILATERAL BACK PAIN, UNSPECIFIED BACK LOCATION: ICD-10-CM

## 2025-01-31 PROCEDURE — 3078F DIAST BP <80 MM HG: CPT | Mod: S$GLB,,, | Performed by: STUDENT IN AN ORGANIZED HEALTH CARE EDUCATION/TRAINING PROGRAM

## 2025-01-31 PROCEDURE — 99214 OFFICE O/P EST MOD 30 MIN: CPT | Mod: S$GLB,,, | Performed by: STUDENT IN AN ORGANIZED HEALTH CARE EDUCATION/TRAINING PROGRAM

## 2025-01-31 PROCEDURE — 3074F SYST BP LT 130 MM HG: CPT | Mod: S$GLB,,, | Performed by: STUDENT IN AN ORGANIZED HEALTH CARE EDUCATION/TRAINING PROGRAM

## 2025-01-31 PROCEDURE — 1159F MED LIST DOCD IN RCRD: CPT | Mod: S$GLB,,, | Performed by: STUDENT IN AN ORGANIZED HEALTH CARE EDUCATION/TRAINING PROGRAM

## 2025-01-31 PROCEDURE — 1160F RVW MEDS BY RX/DR IN RCRD: CPT | Mod: S$GLB,,, | Performed by: STUDENT IN AN ORGANIZED HEALTH CARE EDUCATION/TRAINING PROGRAM

## 2025-01-31 PROCEDURE — 3008F BODY MASS INDEX DOCD: CPT | Mod: S$GLB,,, | Performed by: STUDENT IN AN ORGANIZED HEALTH CARE EDUCATION/TRAINING PROGRAM

## 2025-01-31 RX ORDER — DULOXETIN HYDROCHLORIDE 30 MG/1
CAPSULE, DELAYED RELEASE ORAL
Qty: 134 CAPSULE | Refills: 0 | Status: SHIPPED | OUTPATIENT
Start: 2025-01-31 | End: 2025-04-15

## 2025-01-31 NOTE — PROGRESS NOTES
[facility] - [place], [place]    Family Medicine Office Visit    Subjective     Patient ID: Jennifer Yang is a 29 y.o. female who presents to clinic today to follow up.     Medical history, surgical history, medications, allergies, and social history were reviewed and updated.     Chief Complaint: Depression    HPI    Reports having issues with depression and anxiety. Also feeling angry. She feels that her emotions are more labile than usual. No recent stressors reported. Reports she has struggled with this since high school. Does have a history of bipolar and depression in family. Also has a history of divorce of parents. She was on medication from [age], and decided to come off. About 1 year ago, things were getting worse and affecting her relationships. She feels like laying in bed. Feels she is having trouble getting up and going and feeling sanjay in things.     Was started on Zoloft in 9/2023. Was on this for about 3 months.    She is now on Lexapro. Having night sweats with it. This was decreased to 10 mg, but this is no longer effective.     Having some passive thoughts of suicide, but nothing active. Did used to self harm, but has not done this in over 10 years.     Also reports a history of scoliosis and chronic back pain.  Reported that this has also contributing to her depression.    Vitals:    01/31/25 1558   BP: (!) 108/58   Pulse: 86      Wt Readings from Last 3 Encounters:   01/31/25 1558 55.5 kg (122 lb 4.8 oz)   08/07/24 1523 60.2 kg (132 lb 11.2 oz)   03/20/24 1539 62 kg (136 lb 9.6 oz)      Review of Systems    Review of Systems otherwise negative unless specified above.        Objective     Physical Exam  Vitals reviewed.   Constitutional:       General: She is not in acute distress.     Appearance: Normal appearance.   HENT:      Head: Normocephalic and atraumatic.      Nose: Nose normal.      Mouth/Throat:      Mouth: Mucous membranes are moist.   Cardiovascular:       Rate and Rhythm: Normal rate and regular rhythm.      Heart sounds: No murmur heard.  Pulmonary:      Effort: Pulmonary effort is normal. No respiratory distress.      Breath sounds: Normal breath sounds.   Musculoskeletal:         General: Normal range of motion.   Skin:     General: Skin is warm and dry.   Neurological:      General: No focal deficit present.      Mental Status: She is alert and oriented to person, place, and time.   Psychiatric:         Mood and Affect: Mood normal.         Behavior: Behavior normal.            Assessment and Plan     Current Outpatient Medications   Medication Instructions    DULoxetine (CYMBALTA) 30 MG capsule Take 1 capsule (30 mg total) by mouth once daily for 14 days, THEN 2 capsules (60 mg total) once daily.        1. Severe episode of recurrent major depressive disorder, without psychotic features  Overview:  She has no SI, HI  Was diagnosed in the past with depression/anxiety  Stopped zoloft about 4 years ago b/o her fiance    Orders:  -     DULoxetine (CYMBALTA) 30 MG capsule; Take 1 capsule (30 mg total) by mouth once daily for 14 days, THEN 2 capsules (60 mg total) once daily.  Dispense: 134 capsule; Refill: 0    2. Scoliosis, unspecified scoliosis type, unspecified spinal region  -     DULoxetine (CYMBALTA) 30 MG capsule; Take 1 capsule (30 mg total) by mouth once daily for 14 days, THEN 2 capsules (60 mg total) once daily.  Dispense: 134 capsule; Refill: 0    3. Chronic bilateral back pain, unspecified back location  -     DULoxetine (CYMBALTA) 30 MG capsule; Take 1 capsule (30 mg total) by mouth once daily for 14 days, THEN 2 capsules (60 mg total) once daily.  Dispense: 134 capsule; Refill: 0        We had a long discussion regarding her current medication regimen.  Discussed that I would recommend against further SSRIs as she has taken 2 medications for an appropriate time length without improvement.  We discussed Wellbutrin versus Cymbalta.  She has elected to  move forward with Cymbalta she is hopeful that this will help control her pain as well.  We will start her on a 30 mg dose for 2 weeks and then she will titrate up to 60 mg thereafter.  Recommend that she keep 1 month follow up with PCP and titrate medications further if needed.  Declined flu shot today.         Follow up if symptoms worsen or fail to improve, for Keep scheduled follow up with PCP.    Questions were invited and answered. No other acute concerns at this time. Will plan to follow up as above or sooner if needed.     Alyssa Osorio DO  01/31/2025 4:13 PM       This dictation has been generated using Modal Fluency Dictation. Some phonetic errors may occur. Please contact author for clarification if needed.

## 2025-01-31 NOTE — PATIENT INSTRUCTIONS
Yimi Rodriguez,     If you are due for any health screening(s) below please notify me so we can arrange them to be ordered and scheduled. Most healthy patients at your age complete them, but you are free to accept or refuse.     If you can't do it, I'll definitely understand. If you can, I'd certainly appreciate it!    All of your core healthy metrics are met.

## 2025-05-04 DIAGNOSIS — F33.2 SEVERE EPISODE OF RECURRENT MAJOR DEPRESSIVE DISORDER, WITHOUT PSYCHOTIC FEATURES: ICD-10-CM

## 2025-05-04 DIAGNOSIS — G89.29 CHRONIC BILATERAL BACK PAIN, UNSPECIFIED BACK LOCATION: ICD-10-CM

## 2025-05-04 DIAGNOSIS — M54.9 CHRONIC BILATERAL BACK PAIN, UNSPECIFIED BACK LOCATION: ICD-10-CM

## 2025-05-04 DIAGNOSIS — M41.9 SCOLIOSIS, UNSPECIFIED SCOLIOSIS TYPE, UNSPECIFIED SPINAL REGION: ICD-10-CM

## 2025-05-05 RX ORDER — DULOXETIN HYDROCHLORIDE 30 MG/1
CAPSULE, DELAYED RELEASE ORAL
Qty: 90 CAPSULE | Refills: 0 | OUTPATIENT
Start: 2025-05-05 | End: 2025-07-18

## 2025-05-09 ENCOUNTER — PATIENT MESSAGE (OUTPATIENT)
Dept: FAMILY MEDICINE | Facility: CLINIC | Age: 30
End: 2025-05-09
Payer: COMMERCIAL

## 2025-05-09 DIAGNOSIS — G89.29 CHRONIC BILATERAL BACK PAIN, UNSPECIFIED BACK LOCATION: ICD-10-CM

## 2025-05-09 DIAGNOSIS — M54.9 CHRONIC BILATERAL BACK PAIN, UNSPECIFIED BACK LOCATION: ICD-10-CM

## 2025-05-09 DIAGNOSIS — F33.2 SEVERE EPISODE OF RECURRENT MAJOR DEPRESSIVE DISORDER, WITHOUT PSYCHOTIC FEATURES: ICD-10-CM

## 2025-05-09 DIAGNOSIS — M41.9 SCOLIOSIS, UNSPECIFIED SCOLIOSIS TYPE, UNSPECIFIED SPINAL REGION: ICD-10-CM

## 2025-05-09 RX ORDER — DULOXETIN HYDROCHLORIDE 30 MG/1
CAPSULE, DELAYED RELEASE ORAL
Qty: 134 CAPSULE | Refills: 0 | Status: CANCELLED | OUTPATIENT
Start: 2025-05-09 | End: 2025-07-22

## 2025-05-09 RX ORDER — DULOXETIN HYDROCHLORIDE 60 MG/1
60 CAPSULE, DELAYED RELEASE ORAL DAILY
Qty: 30 CAPSULE | Refills: 0 | Status: SHIPPED | OUTPATIENT
Start: 2025-05-09 | End: 2025-06-08

## 2025-05-13 NOTE — TELEPHONE ENCOUNTER
Provider Staff:  Please note Refusal of medication.     Action required for this patient.      Requested Prescriptions     Refused Prescriptions Disp Refills    DULoxetine (CYMBALTA) 30 MG capsule 90 capsule 0     Sig: Take 1 capsule (30 mg total) by mouth once daily for 14 days, THEN 2 capsules (60 mg total) once daily.     Refused By: MARYA AGUIRRE     Reason for Refusal: Patient needs an appointment      Thanks!  [facility]   Note composed: 05/13/2025 9:41 AM           
Care Due:                  Date            Visit Type   Department     Provider  --------------------------------------------------------------------------------                                             [facility]  Last Visit: 01-      [facility]       Alyssa Osorio  Next Visit: None Scheduled  None         None Found                                                            Last  Test          Frequency    Reason                     Performed    Due Date  --------------------------------------------------------------------------------    Cr..........  12 months..  DULoxetine...............  Not Found    Overdue    [employer] Embedded Care Due Messages. Reference number: [id number].   5/04/2025 11:05:37 PM CDT  
LOV:1/31/25. No current appointment scheduled.  
Refill Routing Note   Medication(s) are not appropriate for processing by [facility] for the following reason(s):        Required labs outdated  Clarification of medication (Rx) details  No active prescription written by provider    [facility] action(s):  Defer   Requires labs : Yes      Medication Therapy Plan:  ON THE MED LIST 4/15/25.      Appointments  past 12m or future 3m with PCP    Date Provider   Last Visit   2024 Blaine Driscoll MD   Next Visit   Visit date not found Blaine Driscoll MD   ED visits in past 90 days: 0        Note composed:7:34 AM 2025              
negative...

## 2025-05-19 ENCOUNTER — OFFICE VISIT (OUTPATIENT)
Dept: FAMILY MEDICINE | Facility: CLINIC | Age: 30
End: 2025-05-19
Payer: COMMERCIAL

## 2025-05-19 VITALS
OXYGEN SATURATION: 99 % | DIASTOLIC BLOOD PRESSURE: 58 MMHG | BODY MASS INDEX: 19.54 KG/M2 | SYSTOLIC BLOOD PRESSURE: 100 MMHG | TEMPERATURE: 98 F | HEIGHT: 66 IN | WEIGHT: 121.56 LBS | HEART RATE: 114 BPM

## 2025-05-19 DIAGNOSIS — Z13.1 DIABETES MELLITUS SCREENING: Primary | ICD-10-CM

## 2025-05-19 DIAGNOSIS — F32.5 MAJOR DEPRESSIVE DISORDER WITH SINGLE EPISODE, IN FULL REMISSION: ICD-10-CM

## 2025-05-19 DIAGNOSIS — Z00.00 WELLNESS EXAMINATION: ICD-10-CM

## 2025-05-19 DIAGNOSIS — Z13.220 ENCOUNTER FOR SCREENING FOR LIPID DISORDER: ICD-10-CM

## 2025-05-19 DIAGNOSIS — J06.9 VIRAL UPPER RESPIRATORY TRACT INFECTION: ICD-10-CM

## 2025-05-19 DIAGNOSIS — D50.0 IRON DEFICIENCY ANEMIA DUE TO CHRONIC BLOOD LOSS: ICD-10-CM

## 2025-05-19 DIAGNOSIS — R61 DIAPHORESIS: ICD-10-CM

## 2025-05-19 PROCEDURE — 85025 COMPLETE CBC W/AUTO DIFF WBC: CPT | Performed by: INTERNAL MEDICINE

## 2025-05-19 PROCEDURE — 3008F BODY MASS INDEX DOCD: CPT | Mod: S$GLB,,, | Performed by: INTERNAL MEDICINE

## 2025-05-19 PROCEDURE — 83036 HEMOGLOBIN GLYCOSYLATED A1C: CPT | Performed by: INTERNAL MEDICINE

## 2025-05-19 PROCEDURE — 80061 LIPID PANEL: CPT | Performed by: INTERNAL MEDICINE

## 2025-05-19 PROCEDURE — 1160F RVW MEDS BY RX/DR IN RCRD: CPT | Mod: S$GLB,,, | Performed by: INTERNAL MEDICINE

## 2025-05-19 PROCEDURE — 84443 ASSAY THYROID STIM HORMONE: CPT | Performed by: INTERNAL MEDICINE

## 2025-05-19 PROCEDURE — 1159F MED LIST DOCD IN RCRD: CPT | Mod: S$GLB,,, | Performed by: INTERNAL MEDICINE

## 2025-05-19 PROCEDURE — 99395 PREV VISIT EST AGE 18-39: CPT | Mod: S$GLB,,, | Performed by: INTERNAL MEDICINE

## 2025-05-19 PROCEDURE — 83540 ASSAY OF IRON: CPT | Performed by: INTERNAL MEDICINE

## 2025-05-19 PROCEDURE — 99213 OFFICE O/P EST LOW 20 MIN: CPT | Mod: 25,S$GLB,, | Performed by: INTERNAL MEDICINE

## 2025-05-19 PROCEDURE — 82947 ASSAY GLUCOSE BLOOD QUANT: CPT | Performed by: INTERNAL MEDICINE

## 2025-05-19 PROCEDURE — 3074F SYST BP LT 130 MM HG: CPT | Mod: S$GLB,,, | Performed by: INTERNAL MEDICINE

## 2025-05-19 PROCEDURE — 3078F DIAST BP <80 MM HG: CPT | Mod: S$GLB,,, | Performed by: INTERNAL MEDICINE

## 2025-05-19 RX ORDER — DULOXETIN HYDROCHLORIDE 60 MG/1
60 CAPSULE, DELAYED RELEASE ORAL DAILY
Qty: 90 CAPSULE | Refills: 1 | Status: SHIPPED | OUTPATIENT
Start: 2025-05-19 | End: 2025-11-15

## 2025-05-19 NOTE — PROGRESS NOTES
Subjective:       Patient ID: Jennifer Yang is a 29 y.o. female.    Chief Complaint: Follow-up, Annual Exam, and URI      History of Present Illness    CHIEF COMPLAINT:  Jennifer presents for follow-up on hot flashes and night sweats, and reports symptoms of a head cold.    HPI:  Jennifer reports ongoing issues with hot flashes and night sweats, initially discussed in October. She was previously prescribed Lexapro, which was then changed to Cymbalta. She continues to have night sweats while on Cymbalta. She also has irregular periods, which have persisted after starting Nexplanon implant. Her periods are heavy, lasting 7 to 10 days. She believes she has a head cold, with symptoms of congestion, sore throat, stuffiness, and headache. She has been taking Cymbalta 60mg daily for depression for about a year, starting at 40mg for a few weeks before increasing to 60mg.    MEDICATIONS:  Jennifer is on Cymbalta 60 mg daily for antidepressant use and has a Nexplanon implant for birth control. She recently started Heb (likely referring to Humebrto appliance) for an unknown reason. She discontinued Lexapro and switched to Cymbalta for hot flashes.    MEDICAL HISTORY:  Jennifer has a history of hot flashes. She needs to make an appointment for her annual Pap smear. Jennifer is currently using a Nexplanon implant for contraception. She experiences irregular periods and heavy menstrual flow lasting 7 to 10 days.    TEST RESULTS:  Jennifer underwent labs last year.    SOCIAL HISTORY:  Occupation:          Review of Systems   Constitutional:  Negative for activity change, fever and unexpected weight change.   Respiratory: Negative.     Cardiovascular: Negative.    Endocrine:        Diaphoresis   Neurological: Negative.          Objective:      Physical Exam  Vitals and nursing note reviewed.   Constitutional:       Appearance: Normal appearance.   Cardiovascular:      Rate and Rhythm: Normal rate and regular rhythm.    Pulmonary:      Effort: Pulmonary effort is normal.      Breath sounds: Normal breath sounds.   Musculoskeletal:      Cervical back: Normal range of motion and neck supple.   Neurological:      Mental Status: She is alert.         Assessment:       1. Diabetes mellitus screening  -     Glucose, Fasting; Future; Expected date: 05/19/2025  -     Hemoglobin A1C; Future; Expected date: 05/19/2025    2. Encounter for screening for lipid disorder  -     Lipid Panel; Future; Expected date: 05/19/2025    3. Iron deficiency anemia due to chronic blood loss  -     Iron and TIBC; Future; Expected date: 05/19/2025  -     CBC Auto Differential; Future; Expected date: 05/19/2025    4. Diaphoresis  Overview:  Started few weeks after changing  starting SSRI    Assessment & Plan:  Now on cymbalta  R/o iron def anemia  R/o anxiety  Get TFT , BS     Orders:  -     TSH; Future; Expected date: 05/19/2025    5. Viral upper respiratory tract infection  Overview:  No f/c  + cough , congestion for few days  No sore throat/ swollen glands      Assessment & Plan:  Increase your intake of fluids and stay hydrated  Use over-the-counter cold and sinus medicine for example DayQuil for daytime ,NyQuil for nighttime  Please add Tylenol , Aleve or Advil as needed for low-grade temperatures and soreness  Get enough rest  No need for treatment with antibiotics at the current time as your symptoms and lack of temp point to a viral or allergic disease  Please call us back or return if fever develops or symptoms progress        6. Wellness examination  Overview:  Patient presents for a wellness visit  No new c/o today except night sweats  Please refer to initial intake notes for screening/preventive measures  All histories and immunization schedule reviewed with patient      Assessment & Plan:  Get annual pap  Reg exercise d/w pt  ? New COVID vaccine      7. Major depressive disorder with single episode, in full remission  Overview:  Stable  Now on  cymbalta 60mg    Assessment & Plan:  Refill  Still c/o excessive diaphoresis      Other orders  -     DULoxetine (CYMBALTA) 60 MG capsule; Take 1 capsule (60 mg total) by mouth once daily.  Dispense: 90 capsule; Refill: 1           Plan:       Assessment & Plan    N95.1 Menopausal and female climacteric states  F32.A Depression, unspecified  N92.5 Other specified irregular menstruation  N92.0 Excessive and frequent menstruation with regular cycle  J00 Acute nasopharyngitis [common cold]  Z97.8 Presence of other specified devices    MENOPAUSAL AND CLIMACTERIC STATES:  - Jennifer reports persistent night sweats and hot flashes despite medication.  - Considered potential causes including thyroid dysfunction.  - Evaluated effectiveness of current Cymbalta treatment, noting patient has been on 60 mg daily for a year.  - Assessment determined to maintain current dosage.  - Renewed Cymbalta 60 mg daily prescription.    IRREGULAR MENSTRUATION:  - Jennifer reports irregular periods despite being on birth control (Nexplanon implant).  - Periods last between 7-10 days.    EXCESSIVE MENSTRUATION:  - Jennifer reports heavy menstrual flow during her 7-10 day periods.  - Considered potential iron deficiency due to menorrhagia.    ACUTE NASOPHARYNGITIS (COMMON COLD):  - Jennifer presents with congestion, pharyngitis, and nasal stuffiness consistent with common cold.  - Provided work excuse for today (May 19) with return to work tomorrow (May 20) if symptoms improve.    PRESENCE OF CONTRACEPTIVE DEVICE:  - Jennifer is currently using Nexplanon implant for contraception.    FOLLOW-UP AND ADDITIONAL TESTS:  - Ordered comprehensive fasting labs to investigate underlying causes of multiple symptoms, including thyroid function, iron studies, CBC, and glucose.  - Iron levels specifically ordered to evaluate impact of heavy and irregular menstruation.       Jennifer was seen today for follow-up, annual exam and uri.    Diagnoses and all orders for  this visit:    Diabetes mellitus screening  -     Glucose, Fasting; Future  -     Hemoglobin A1C; Future    Encounter for screening for lipid disorder  -     Lipid Panel; Future    Iron deficiency anemia due to chronic blood loss  -     Iron and TIBC; Future  -     CBC Auto Differential; Future    Diaphoresis  -     TSH; Future    Viral upper respiratory tract infection    Wellness examination    Major depressive disorder with single episode, in full remission    Other orders  -     DULoxetine (CYMBALTA) 60 MG capsule; Take 1 capsule (60 mg total) by mouth once daily.

## 2025-05-19 NOTE — LETTER
May 19, 2025      [facility]  [address] [place] [address]  Phone: [phone]       Patient: Jennifer Yang   YOB: 1995  Date of Visit: 05/19/2025    To Whom It May Concern:    AURA Yang  was at [facility] on 05/19/2025. The patient may return to work/school on 5/20/25 with no restrictions. If you have any questions or concerns, or if I can be of further assistance, please do not hesitate to contact me.    Sincerely,    Blaine Driscoll MD

## 2025-05-20 ENCOUNTER — RESULTS FOLLOW-UP (OUTPATIENT)
Dept: FAMILY MEDICINE | Facility: CLINIC | Age: 30
End: 2025-05-20

## 2025-05-20 ENCOUNTER — PATIENT MESSAGE (OUTPATIENT)
Dept: FAMILY MEDICINE | Facility: CLINIC | Age: 30
End: 2025-05-20
Payer: COMMERCIAL

## 2025-05-20 RX ORDER — BENZONATATE 200 MG/1
200 CAPSULE ORAL 3 TIMES DAILY PRN
Qty: 30 CAPSULE | Refills: 0 | Status: SHIPPED | OUTPATIENT
Start: 2025-05-20 | End: 2025-05-30

## 2025-05-28 ENCOUNTER — PATIENT MESSAGE (OUTPATIENT)
Dept: FAMILY MEDICINE | Facility: CLINIC | Age: 30
End: 2025-05-28
Payer: COMMERCIAL

## 2025-05-28 RX ORDER — AMOXICILLIN 500 MG/1
500 TABLET, FILM COATED ORAL EVERY 12 HOURS
Qty: 10 TABLET | Refills: 0 | Status: SHIPPED | OUTPATIENT
Start: 2025-05-28 | End: 2025-06-02

## 2025-06-23 ENCOUNTER — TELEPHONE (OUTPATIENT)
Dept: FAMILY MEDICINE | Facility: CLINIC | Age: 30
End: 2025-06-23
Payer: COMMERCIAL

## 2025-06-23 ENCOUNTER — PATIENT MESSAGE (OUTPATIENT)
Dept: FAMILY MEDICINE | Facility: CLINIC | Age: 30
End: 2025-06-23
Payer: COMMERCIAL

## 2025-06-23 NOTE — TELEPHONE ENCOUNTER
Called to reschedule patient's upcoming appointment, as Dr. Zarate is no longer with our practice. There was no answer, so a voicemail was left requesting a call back to reschedule with another provider.

## 2025-07-02 ENCOUNTER — PATIENT MESSAGE (OUTPATIENT)
Dept: INTERNAL MEDICINE | Facility: CLINIC | Age: 30
End: 2025-07-02
Payer: COMMERCIAL

## 2025-07-07 ENCOUNTER — OFFICE VISIT (OUTPATIENT)
Dept: FAMILY MEDICINE | Facility: CLINIC | Age: 30
End: 2025-07-07
Payer: COMMERCIAL

## 2025-07-07 VITALS
WEIGHT: 124.44 LBS | HEART RATE: 70 BPM | SYSTOLIC BLOOD PRESSURE: 112 MMHG | DIASTOLIC BLOOD PRESSURE: 60 MMHG | BODY MASS INDEX: 20 KG/M2 | HEIGHT: 66 IN | OXYGEN SATURATION: 100 %

## 2025-07-07 DIAGNOSIS — F33.9 RECURRENT MAJOR DEPRESSION RESISTANT TO TREATMENT: Primary | ICD-10-CM

## 2025-07-07 PROBLEM — F32.5 MAJOR DEPRESSIVE DISORDER WITH SINGLE EPISODE, IN FULL REMISSION: Status: RESOLVED | Noted: 2025-05-19 | Resolved: 2025-07-07

## 2025-07-07 PROBLEM — R61 DIAPHORESIS: Status: RESOLVED | Noted: 2024-08-07 | Resolved: 2025-07-07

## 2025-07-07 PROBLEM — Z00.00 WELLNESS EXAMINATION: Status: RESOLVED | Noted: 2024-08-07 | Resolved: 2025-07-07

## 2025-07-07 PROBLEM — J06.9 VIRAL UPPER RESPIRATORY TRACT INFECTION: Status: RESOLVED | Noted: 2025-05-19 | Resolved: 2025-07-07

## 2025-07-07 PROBLEM — F33.2 SEVERE EPISODE OF RECURRENT MAJOR DEPRESSIVE DISORDER, WITHOUT PSYCHOTIC FEATURES: Status: RESOLVED | Noted: 2023-08-23 | Resolved: 2025-07-07

## 2025-07-07 PROCEDURE — 1160F RVW MEDS BY RX/DR IN RCRD: CPT | Mod: S$GLB,,, | Performed by: STUDENT IN AN ORGANIZED HEALTH CARE EDUCATION/TRAINING PROGRAM

## 2025-07-07 PROCEDURE — 3078F DIAST BP <80 MM HG: CPT | Mod: S$GLB,,, | Performed by: STUDENT IN AN ORGANIZED HEALTH CARE EDUCATION/TRAINING PROGRAM

## 2025-07-07 PROCEDURE — 99213 OFFICE O/P EST LOW 20 MIN: CPT | Mod: S$GLB,,, | Performed by: STUDENT IN AN ORGANIZED HEALTH CARE EDUCATION/TRAINING PROGRAM

## 2025-07-07 PROCEDURE — 3044F HG A1C LEVEL LT 7.0%: CPT | Mod: S$GLB,,, | Performed by: STUDENT IN AN ORGANIZED HEALTH CARE EDUCATION/TRAINING PROGRAM

## 2025-07-07 PROCEDURE — 3008F BODY MASS INDEX DOCD: CPT | Mod: S$GLB,,, | Performed by: STUDENT IN AN ORGANIZED HEALTH CARE EDUCATION/TRAINING PROGRAM

## 2025-07-07 PROCEDURE — G2211 COMPLEX E/M VISIT ADD ON: HCPCS | Mod: S$GLB,,, | Performed by: STUDENT IN AN ORGANIZED HEALTH CARE EDUCATION/TRAINING PROGRAM

## 2025-07-07 PROCEDURE — 3074F SYST BP LT 130 MM HG: CPT | Mod: S$GLB,,, | Performed by: STUDENT IN AN ORGANIZED HEALTH CARE EDUCATION/TRAINING PROGRAM

## 2025-07-07 PROCEDURE — 1159F MED LIST DOCD IN RCRD: CPT | Mod: S$GLB,,, | Performed by: STUDENT IN AN ORGANIZED HEALTH CARE EDUCATION/TRAINING PROGRAM

## 2025-07-07 RX ORDER — ARIPIPRAZOLE 5 MG/1
5 TABLET ORAL DAILY
Qty: 30 TABLET | Refills: 1 | Status: SHIPPED | OUTPATIENT
Start: 2025-07-14 | End: 2026-07-14

## 2025-07-07 RX ORDER — DULOXETIN HYDROCHLORIDE 30 MG/1
30 CAPSULE, DELAYED RELEASE ORAL DAILY
Qty: 14 CAPSULE | Refills: 0 | Status: SHIPPED | OUTPATIENT
Start: 2025-07-07

## 2025-07-07 NOTE — PROGRESS NOTES
[facility] - [place], [place]    Family Medicine Office Visit    Subjective     Patient ID: Jennifer Yang is a 29 y.o. female who presents to clinic today to establish care.     Medical history, surgical history, medications, allergies, and social history were reviewed and updated.     Chief Complaint: Medication Refill    HPI    Establish care  She presents today to establish care.  We have reviewed medical history, surgical history, family history, medications, allergies, and social history. EMR was updated appropriately.     Depression  Has a history of depression.  She has previously taken to SSRIs without good effect.  We now have her on a trial of Cymbalta.  She reports feeling numb. Unsure if the Cymbalta is working well. Not feeling sad. Reports feeling angry. Grandmother has a history of bipolar. Having difficulty with motivation. Having trouble getting out of bed to do things.  She is concerned that she may have bipolar as well.  No suicidal or homicidal ideation.    We discussed a few different medication options and she has elected to move forward with getting off the Cymbalta and trying a different medication that has more specific for treatment resistant depression versus bipolar.  We discussed a trial of Abilify, which she was agreeable to.  We will wean her off Cymbalta over the next 2 weeks and then start a trial of Abilify at 5 mg daily.  We will plan to increase this as tolerated.    Vitals:    07/07/25 0800   BP: 112/60   Pulse: 70      Wt Readings from Last 3 Encounters:   07/07/25 0800 56.4 kg (124 lb 7.2 oz)   05/19/25 1236 55.2 kg (121 lb 9.3 oz)   01/31/25 1558 55.5 kg (122 lb 4.8 oz)      Review of Systems    Review of Systems otherwise negative unless specified above.        Objective     Physical Exam  Vitals reviewed.   Constitutional:       General: She is not in acute distress.     Appearance: Normal appearance.   HENT:      Head: Normocephalic and  atraumatic.      Nose: Nose normal.      Mouth/Throat:      Mouth: Mucous membranes are moist.   Cardiovascular:      Rate and Rhythm: Normal rate and regular rhythm.      Heart sounds: No murmur heard.  Pulmonary:      Effort: Pulmonary effort is normal. No respiratory distress.      Breath sounds: Normal breath sounds.   Musculoskeletal:         General: Normal range of motion.   Skin:     General: Skin is warm and dry.   Neurological:      General: No focal deficit present.      Mental Status: She is alert and oriented to person, place, and time.   Psychiatric:         Mood and Affect: Mood normal.         Behavior: Behavior normal.            Assessment and Plan     Current Outpatient Medications   Medication Instructions    [START ON 7/14/2025] ARIPiprazole (ABILIFY) 5 mg, Oral, Daily    DULoxetine (CYMBALTA) 30 mg, Oral, Daily        1. 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  Dispense: 14 capsule; Refill: 0  -     ARIPiprazole (ABILIFY) 5 MG Tab; Take 1 tablet (5 mg total) by mouth once daily.  Dispense: 30 tablet; Refill: 1        Weaning off Cymbalta and starting Abilify as above.  We will plan to have her follow up in 6 weeks and titrate medications as tolerated.    Visit today included increased complexity associated with the care of the episodic problem depression addressed and managing the longitudinal care of the patient due to the serious and/or complex managed problem(s) depression.         Follow up in about 6 weeks (around 8/18/2025) for Follow up with Jo.    Questions were invited and answered. No other acute concerns at this time. Will plan to follow up as above or sooner if needed.     Alyssa Osorio,   07/07/2025 8:06 AM       This dictation has been generated using Modal Fluency Dictation. Some phonetic errors may occur. Please contact author for clarification if needed.

## 2025-07-21 ENCOUNTER — PATIENT MESSAGE (OUTPATIENT)
Dept: INTERNAL MEDICINE | Facility: CLINIC | Age: 30
End: 2025-07-21
Payer: COMMERCIAL

## 2025-08-04 ENCOUNTER — OFFICE VISIT (OUTPATIENT)
Dept: FAMILY MEDICINE | Facility: CLINIC | Age: 30
End: 2025-08-04
Payer: COMMERCIAL

## 2025-08-04 ENCOUNTER — PATIENT MESSAGE (OUTPATIENT)
Dept: FAMILY MEDICINE | Facility: CLINIC | Age: 30
End: 2025-08-04
Payer: COMMERCIAL

## 2025-08-04 VITALS
OXYGEN SATURATION: 100 % | HEART RATE: 103 BPM | BODY MASS INDEX: 20.41 KG/M2 | SYSTOLIC BLOOD PRESSURE: 112 MMHG | DIASTOLIC BLOOD PRESSURE: 58 MMHG | WEIGHT: 127 LBS | HEIGHT: 66 IN

## 2025-08-04 DIAGNOSIS — R21 RASH AND NONSPECIFIC SKIN ERUPTION: Primary | ICD-10-CM

## 2025-08-04 PROCEDURE — 3044F HG A1C LEVEL LT 7.0%: CPT | Mod: S$GLB,,, | Performed by: FAMILY MEDICINE

## 2025-08-04 PROCEDURE — 3078F DIAST BP <80 MM HG: CPT | Mod: S$GLB,,, | Performed by: FAMILY MEDICINE

## 2025-08-04 PROCEDURE — 3074F SYST BP LT 130 MM HG: CPT | Mod: S$GLB,,, | Performed by: FAMILY MEDICINE

## 2025-08-04 PROCEDURE — 3008F BODY MASS INDEX DOCD: CPT | Mod: S$GLB,,, | Performed by: FAMILY MEDICINE

## 2025-08-04 PROCEDURE — 99999 PR PBB SHADOW E&M-EST. PATIENT-LVL III: CPT | Mod: PBBFAC,,, | Performed by: FAMILY MEDICINE

## 2025-08-04 PROCEDURE — 99213 OFFICE O/P EST LOW 20 MIN: CPT | Mod: S$GLB,,, | Performed by: FAMILY MEDICINE

## 2025-08-04 PROCEDURE — 1159F MED LIST DOCD IN RCRD: CPT | Mod: S$GLB,,, | Performed by: FAMILY MEDICINE

## 2025-08-04 RX ORDER — FLUOCINONIDE CREAM (EMULSIFIED BASE) 0.5 MG/G
CREAM TOPICAL
Qty: 30 G | Refills: 0 | Status: SHIPPED | OUTPATIENT
Start: 2025-08-04

## 2025-08-06 NOTE — PROGRESS NOTES
"[facility] Family [facility]      Subjective         Chief Complaint   Patient presents with    Rash     x6d      Pleasant 29-year-old  female presents with a rash over the dorsal hands for the past 6 days.  The patient reports recently starting Abilify by her PCP.  While she is feeling well the medication she had a small rash develop over the dorsal hands bilaterally that remains nonpruritic and fails to resolve.  She has otherwise felt well, no fever.  She denies recent upper respiratory tract infection.  She thinks maybe related to her new medication.    Past Medical History:   Diagnosis Date    Depression         Past Surgical History:   Procedure Laterality Date    WISDOM TOOTH EXTRACTION Bilateral         Review of patient's allergies indicates:  No Known Allergies     Review of Systems   Constitutional: Negative.    HENT: Negative.     Eyes: Negative.    Respiratory: Negative.     Cardiovascular: Negative.    Gastrointestinal: Negative.    Genitourinary: Negative.    Musculoskeletal: Negative.    Skin:         As above   Neurological: Negative.    Endo/Heme/Allergies: Negative.    Psychiatric/Behavioral: Negative.                 Objective      Vitals:    08/04/25 1600   BP: (!) 112/58   BP Location: Right arm   Patient Position: Sitting   Pulse: 103   SpO2: 100%   Weight: 57.6 kg (127 lb)   Height: 5' 6" (1.676 m)        Physical Exam  Constitutional:       Appearance: Normal appearance.   Skin:     Comments: Dermatologic exam:  The palms of the hands bilaterally normal skin color, turgor and texture.  Dorsal hands bilaterally reveal multiple punctate non erythematous with that scale, 1-2 mm lesions 8-10 on the left overlying the thumb and proximal part of the index finger.  The right hand reveals 1.0 cm plaque that appears hyperpigmented without erythema fluctuance nodularity or tenderness.   Neurological:      Mental Status: She is alert.         Lab Results   Component Value Date    " "TSH 0.312 (L) 05/19/2025        Lab Results   Component Value Date    HGBA1C 4.7 05/19/2025    HGBA1C 4.8 02/26/2024    HGBA1C 4.8 03/24/2023      Lab Results   Component Value Date    HGBA1C 4.7 05/19/2025    HGBA1C 4.8 02/26/2024    HGBA1C 4.8 03/24/2023     CMP  No results found for: "NA", "K", "CL", "CO2", "GLU", "BUN", "CREATININE", "CALCIUM", "PROT", "ALBUMIN", "BILITOT", "ALKPHOS", "AST", "ALT", "ANIONGAP", "ESTGFRAFRICA", "EGFRNONAA"   Lab Results   Component Value Date    CHOL 111 (L) 05/19/2025    CHOL 127 02/26/2024    CHOL 137 03/24/2023     Lab Results   Component Value Date    HDL 47 05/19/2025    HDL 46 02/26/2024    HDL 45 03/24/2023     Lab Results   Component Value Date    LDLCALC 52.4 (L) 05/19/2025    LDLCALC 71.2 02/26/2024    LDLCALC 77.4 03/24/2023     Lab Results   Component Value Date    TRIG 58 05/19/2025    TRIG 49 02/26/2024    TRIG 73 03/24/2023     Lab Results   Component Value Date    CHOLHDL 42.3 05/19/2025    CHOLHDL 36.2 02/26/2024    CHOLHDL 32.8 03/24/2023          Medication List with Changes/Refills   New Medications    FLUOCINONIDE-EMOLLIENT (FLUOCINONIDE-E) 0.05 % CREA    Apply thin layer to hand BID x 7   Current Medications    ARIPIPRAZOLE (ABILIFY) 5 MG TAB    Take 1 tablet (5 mg total) by mouth once daily.   Discontinued Medications    DULOXETINE (CYMBALTA) 30 MG CAPSULE    Take 1 capsule (30 mg total) by mouth once daily.           Assessment & Plan     Assessment & Plan  Rash and nonspecific skin eruption  Low index of suspicion related to medication however the patient should follow up with her PCP prior to discontinue with the medication.  -the rash is currently stable and unchanging therefore we will start topical Fluocinonide b.i.d. x7 days  -the patient should avoid intense exposure particularly of the hands or use a protective covering if exposed greater than 20 minutes.        Follow up with PCP     Christopher L Jones, MD [facility] [place] [facility]  [address] [facility] [place] [address]  [phone]

## 2025-08-14 ENCOUNTER — PATIENT MESSAGE (OUTPATIENT)
Dept: FAMILY MEDICINE | Facility: CLINIC | Age: 30
End: 2025-08-14
Payer: COMMERCIAL

## 2025-08-20 ENCOUNTER — OFFICE VISIT (OUTPATIENT)
Dept: FAMILY MEDICINE | Facility: CLINIC | Age: 30
End: 2025-08-20
Payer: COMMERCIAL

## 2025-08-20 VITALS
HEIGHT: 66 IN | HEART RATE: 83 BPM | OXYGEN SATURATION: 100 % | DIASTOLIC BLOOD PRESSURE: 60 MMHG | SYSTOLIC BLOOD PRESSURE: 100 MMHG | BODY MASS INDEX: 19.93 KG/M2 | WEIGHT: 124 LBS

## 2025-08-20 DIAGNOSIS — F33.9 RECURRENT MAJOR DEPRESSION RESISTANT TO TREATMENT: Primary | ICD-10-CM

## 2025-08-20 PROCEDURE — G2211 COMPLEX E/M VISIT ADD ON: HCPCS | Mod: S$GLB,,, | Performed by: STUDENT IN AN ORGANIZED HEALTH CARE EDUCATION/TRAINING PROGRAM

## 2025-08-20 PROCEDURE — 99213 OFFICE O/P EST LOW 20 MIN: CPT | Mod: S$GLB,,, | Performed by: STUDENT IN AN ORGANIZED HEALTH CARE EDUCATION/TRAINING PROGRAM

## 2025-08-20 PROCEDURE — 1159F MED LIST DOCD IN RCRD: CPT | Mod: S$GLB,,, | Performed by: STUDENT IN AN ORGANIZED HEALTH CARE EDUCATION/TRAINING PROGRAM

## 2025-08-20 PROCEDURE — 3008F BODY MASS INDEX DOCD: CPT | Mod: S$GLB,,, | Performed by: STUDENT IN AN ORGANIZED HEALTH CARE EDUCATION/TRAINING PROGRAM

## 2025-08-20 PROCEDURE — 3074F SYST BP LT 130 MM HG: CPT | Mod: S$GLB,,, | Performed by: STUDENT IN AN ORGANIZED HEALTH CARE EDUCATION/TRAINING PROGRAM

## 2025-08-20 PROCEDURE — 3044F HG A1C LEVEL LT 7.0%: CPT | Mod: S$GLB,,, | Performed by: STUDENT IN AN ORGANIZED HEALTH CARE EDUCATION/TRAINING PROGRAM

## 2025-08-20 PROCEDURE — 3078F DIAST BP <80 MM HG: CPT | Mod: S$GLB,,, | Performed by: STUDENT IN AN ORGANIZED HEALTH CARE EDUCATION/TRAINING PROGRAM

## 2025-08-20 PROCEDURE — 1160F RVW MEDS BY RX/DR IN RCRD: CPT | Mod: S$GLB,,, | Performed by: STUDENT IN AN ORGANIZED HEALTH CARE EDUCATION/TRAINING PROGRAM

## 2025-08-20 RX ORDER — ARIPIPRAZOLE 5 MG/1
5 TABLET ORAL DAILY
Qty: 30 TABLET | Refills: 5 | Status: SHIPPED | OUTPATIENT
Start: 2025-08-20 | End: 2026-08-20